# Patient Record
(demographics unavailable — no encounter records)

---

## 2024-10-14 NOTE — PHYSICAL EXAM
[Left] : left knee [5___] : hamstring 5[unfilled]/5 [Equivocal] : equivocal Dheeraj [] : patient ambulates without assistive device [TWNoteComboBox7] : flexion 100 degrees [de-identified] : extension 3 degrees

## 2024-10-14 NOTE — HISTORY OF PRESENT ILLNESS
[8] : 8 [2] : 2 [Dull/Aching] : dull/aching [Localized] : localized [Sharp] : sharp [Frequent] : frequent [Leisure] : leisure [Sleep] : sleep [Rest] : rest [Meds] : meds [Ice] : ice [Standing] : standing [Walking] : walking [Stairs] : stairs [de-identified] : Patient BRODERICK ORTIZ is 62 years old and is here to be evaluated and treated for the LEFT KNEE. patient states the pain started about 3-4 weeks ago. patient states they have been having pain, swelling, stiffness, and loss of ROM. Patient has been using over the counter meds, CBD oils, and ice to help with the injury. Patient states they have been having intermittent knee pain for years. [] : no [FreeTextEntry1] : Left knee

## 2024-10-14 NOTE — DISCUSSION/SUMMARY
[Medication Risks Reviewed] : Medication risks reviewed [de-identified] : General Dx Discussion The patient was advised of the diagnosis. The natural history of the pathology was explained in full to the patient in layman's terms. All questions were answered. The risks and benefits of surgical and non-surgical treatment alternatives were explained in full to the patient.  will get a mri to eval for tear  will try mobic  Patient was given a prescription for an anti-inflammatory medication.  They will take it for the next 5-7 days and then on an as needed basis, as long as there are no medical contra-indications.  Patient is counseled on possible GI and blood pressure side effects.

## 2024-10-21 NOTE — HISTORY OF PRESENT ILLNESS
[8] : 8 [2] : 2 [Dull/Aching] : dull/aching [Localized] : localized [Sharp] : sharp [Frequent] : frequent [Leisure] : leisure [Sleep] : sleep [Rest] : rest [Meds] : meds [Ice] : ice [Standing] : standing [Walking] : walking [Stairs] : stairs [de-identified] : Patient is here today for a follow up for left knee MRI results. He reports that he feels the same since the last office visit.  [] : no [FreeTextEntry1] : Left knee

## 2024-10-21 NOTE — PHYSICAL EXAM
[Left] : left knee [5___] : hamstring 5[unfilled]/5 [Positive] : positive Dheeraj [] : patient ambulates without assistive device [TWNoteComboBox7] : flexion 95 degrees [de-identified] : extension 3 degrees

## 2024-10-21 NOTE — DISCUSSION/SUMMARY
[Medication Risks Reviewed] : Medication risks reviewed [de-identified] : General Dx Discussion The patient was advised of the diagnosis. The natural history of the pathology was explained in full to the patient in layman's terms. All questions were answered. The risks and benefits of surgical and non-surgical treatment alternatives were explained in full to the patient.  Case Discussed. MRI reviewed, has occ. catching with pain and loss of motion  Arthroscopy with partial medial / lateral; meniscectomy and debridement / synovectomy discussed Risks, benefits and alternatives discussed. Risks include, but are not limited to infection, blood clot, nerve damage, recurrent tear, loss of motion, continued pain, worsened pain, need for another surgery in the future. Discussed that the surgery will not address any pain that he has from any OA he may have. He understands he will not be 100% better after surgery. Questions answered. He expressed understanding and would like to proceed.  The patient has two pathologic conditions at this point.  There is a meniscus tear which is causing symptomology and there is also underlying osteoarthritis.  The patient was counseled that surgical intervention to address the torn meniscus will not change the symptomology attributable to the arthritis.  The patient was advised that the pain attributable to the meniscus tear should be resolved arthroscopically.  However, the patient should expect to have continued aches and pains related to the arthritis, in the form of, but not limited to pain, weather related changes, dull ache, crepitation, limitation of motion and strength and the need for further surgeries. The patient understands that the arthroscopic procedure addresses the meniscus only and that after surgery, the knee will not be 100% but it should be improved with respect to the symptomology attributed to the torn meniscus.  Patient also is aware that further treatment after arthroscopy may be necessary in the form of oral medications, cortisone and/or viscosupplementation injections, and further surgeries including knee replacement.  The patient agrees, understands and wishes to proceed.  The patient was advised of the diagnosis.  The natural history of the pathology was explained in full to the patient in layman's terms. All questions were answered.  The risks and benefits of surgical and non-surgical treatment alternatives were explained in full to the patient.   The patient demonstrated a full understanding of the surgical and non-surgical options.  The risks of surgery were outlined in full to the patient including but not limited to bleeding, scarring, infection, sepsis, neurologic injury, vascular injury, failure to resolve symptoms, symptom recurrence, the need for further surgery, non-healing, wound breakdown, deep vein thrombosis, pulmonary embolism, spontaneous osteonecrosis, anesthesia complications and even death.  The patient understood all the risks and accepted them and understood that other complications could occur that are not mentioned above.  The intraoperative plan, post-operative plan, post-operative expectations and limitations were explained in full.  Expectations from non-surgical treatment were explained in full as well.  The patient demonstrated a complete understanding of the treatment alternatives and requested the above-mentioned procedure.  This will be scheduled accordingly.

## 2024-11-07 NOTE — HISTORY OF PRESENT ILLNESS
[6] : 6 [3] : 3 [Sharp] : sharp [Occasional] : occasional [Retired] : Work status: retired [de-identified] : Here for f/u left knee s/p arthroscopy, partial medial and lateral meniscectomy, synovectomy and removal of loose bodies on 10/30/24. He is doing ok. He was doing well for the first 5 days and then 2 days ago he tripped over his toe and bent his knee. He c/o increasing pain and swelling since.  [] : no [FreeTextEntry1] : Left knee [FreeTextEntry9] : Ibuprofen, meloxicam [de-identified] : getting up from a sitting position [de-identified] : none

## 2024-11-07 NOTE — PROCEDURE
[Large Joint Injection] : Large joint injection [Left] : of the left [Pain] : pain [Inflammation] : inflammation [Alcohol] : alcohol [Betadine] : betadine [Ethyl Chloride sprayed topically] : ethyl chloride sprayed topically [Sterile technique used] : sterile technique used [Effusion] : effusion [] : Patient tolerated procedure well [Call if redness, pain or fever occur] : call if redness, pain or fever occur [Apply ice for 15min out of every hour for the next 12-24 hours as tolerated] : apply ice for 15 minutes out of every hour for the next 12-24 hours as tolerated [Previous OTC use and PT nontherapeutic] : patient has tried OTC's including aspirin, Ibuprofen, Aleve, etc or prescription NSAIDS, and/or exercises at home and/or physical therapy without satisfactory response [Patient had decreased mobility in the joint] : patient had decreased mobility in the joint [Risks, benefits, alternatives discussed / Verbal consent obtained] : the risks benefits, and alternatives have been discussed, and verbal consent was obtained [de-identified] : 45cc [de-identified] : bloody fluid

## 2024-11-07 NOTE — DISCUSSION/SUMMARY
[de-identified] : General Dx Discussion The patient was advised of the diagnosis. The natural history of the pathology was explained in full to the patient in layman's terms. All questions were answered. The risks and benefits of surgical and non-surgical treatment alternatives were explained in full to the patient.  Case Discussed. Surgical Pictures reviewed. Left knee Aspiration today and tolerated well.  Recommend Ace Bandage.  Will go for a course of PT. He states he is leaving to go to [place] and he will find a therapist in [place].  Advised he is at risk for DVT. Therefore advised if he develops any calf pain to report to the nearest ER.  He will continue taking Aspirin daily.  f/u in 4 weeks or when he returns.     Entered by Ivone LANDON acting as a scribe. Instructions: Dr. Quiñonez- The documentation recorded by the scribe accurately reflects the service I personally performed and the decisions made by me.

## 2024-12-05 NOTE — HISTORY OF PRESENT ILLNESS
[3] : 3 [0] : 0 [Sharp] : sharp [Occasional] : occasional [Retired] : Work status: retired [de-identified] : Here for f/u left knee s/p arthroscopy, partial medial and lateral meniscectomy, synovectomy and removal of loose bodies on 10/30/24. Patient reports doing better since the last visit. Attending PT 3x a week.  [] : no [FreeTextEntry1] : Left knee [FreeTextEntry9] : Ibuprofen, meloxicam [de-identified] : getting up from a sitting position [de-identified] : PT 3x week

## 2025-01-16 NOTE — DISCUSSION/SUMMARY
[de-identified] : General Dx Discussion The patient was advised of the diagnosis. The natural history of the pathology was explained in full to the patient in layman's terms. All questions were answered. The risks and benefits of surgical and non-surgical treatment alternatives were explained in full to the patient.  doing well f/u prn poss of viscosupp. discussed.

## 2025-01-16 NOTE — PHYSICAL EXAM
[Left] : left knee [NL (0)] : extension 0 degrees [5___] : hamstring 5[unfilled]/5 [Negative] : negative Shannan's [] : no pain with varus stress [TWNoteComboBox7] : flexion 120 degrees

## 2025-01-16 NOTE — REASON FOR VISIT
[FreeTextEntry2] : Post op-left knee doing well reports some stiffness  reports preop pain is gone  mild stiffness has been able to play 2.5 hours of raquetball

## 2025-01-16 NOTE — HISTORY OF PRESENT ILLNESS
[3] : 3 [0] : 0 [Sharp] : sharp [Occasional] : occasional [Retired] : Work status: retired [de-identified] : Here for f/u left knee s/p arthroscopy, partial medial and lateral meniscectomy, synovectomy and removal of loose bodies on 10/30/24. Patient reports doing better since the last visit. Attending PT 3x a week.  [] : This patient has had an injection before: no [FreeTextEntry1] : Left knee [FreeTextEntry9] : Ibuprofen, meloxicam [de-identified] : getting up from a sitting position [de-identified] : PT 3x week [de-identified] : 10.30.24 [de-identified] : left knee

## 2025-07-05 NOTE — PHYSICAL EXAM
[Right] : right knee [5___] : hamstring 5[unfilled]/5 [Equivocal] : equivocal Dheeraj [] : no pain with varus stress [FreeTextEntry3] : warmth anterior knee minimal erythema  [TWNoteComboBox7] : flexion 100 degrees [de-identified] : extension 5 degrees

## 2025-07-05 NOTE — DISCUSSION/SUMMARY
[Medication Risks Reviewed] : Medication risks reviewed [de-identified] : General Dx Discussion The patient was advised of the diagnosis. The natural history of the pathology was explained in full to the patient in layman's terms. All questions were answered. The risks and benefits of surgical and non-surgical treatment alternatives were explained in full to the patient.  will try keflex 500 mg po tid stomach issues discussed with him with taking antibiotics  advised if symptoms or redness get worse to go to the ER  f/u 1-2 weeks sooner if any issiues  will also try naprosyn 500 mg po qd  Patient was given a prescription for an anti-inflammatory medication.  They will take it for the next 5-7 days and then on an as needed basis, as long as there are no medical contra-indications.  Patient is counseled on possible GI and blood pressure side effects.

## 2025-07-05 NOTE — PHYSICAL EXAM
[Right] : right knee [5___] : hamstring 5[unfilled]/5 [Equivocal] : equivocal Dheeraj [] : no pain with varus stress [FreeTextEntry3] : warmth anterior knee minimal erythema  [TWNoteComboBox7] : flexion 100 degrees [de-identified] : extension 5 degrees

## 2025-07-05 NOTE — IMAGING
[Right] : right knee [There are no fractures, subluxations or dislocations. No significant abnormalities are seen] : There are no fractures, subluxations or dislocations. No significant abnormalities are seen [FreeTextEntry9] : medial narrowing

## 2025-07-05 NOTE — HISTORY OF PRESENT ILLNESS
[Sudden] : sudden [8] : 8 [3] : 3 [Localized] : localized [Nothing helps with pain getting better] : Nothing helps with pain getting better [Retired] : Work status: retired [de-identified] : 7/3/25: 61 y/o male returns today complaining of RT knee pain that started 3 days ago, denies specific injury. Notes that he was sitting for 10+ hours and played racket ball the day prior to pain.  [] : no [FreeTextEntry1] : Right knee pain  [de-identified] : bending knee, pressure

## 2025-07-05 NOTE — DISCUSSION/SUMMARY
[Medication Risks Reviewed] : Medication risks reviewed [de-identified] : General Dx Discussion The patient was advised of the diagnosis. The natural history of the pathology was explained in full to the patient in layman's terms. All questions were answered. The risks and benefits of surgical and non-surgical treatment alternatives were explained in full to the patient.  will try keflex 500 mg po tid stomach issues discussed with him with taking antibiotics  advised if symptoms or redness get worse to go to the ER  f/u 1-2 weeks sooner if any issiues  will also try naprosyn 500 mg po qd  Patient was given a prescription for an anti-inflammatory medication.  They will take it for the next 5-7 days and then on an as needed basis, as long as there are no medical contra-indications.  Patient is counseled on possible GI and blood pressure side effects.

## 2025-07-05 NOTE — HISTORY OF PRESENT ILLNESS
[Sudden] : sudden [8] : 8 [3] : 3 [Localized] : localized [Nothing helps with pain getting better] : Nothing helps with pain getting better [Retired] : Work status: retired [de-identified] : 7/3/25: 63 y/o male returns today complaining of RT knee pain that started 3 days ago, denies specific injury. Notes that he was sitting for 10+ hours and played racket ball the day prior to pain.  [] : no [FreeTextEntry1] : Right knee pain  [de-identified] : bending knee, pressure